# Patient Record
Sex: MALE | Race: WHITE | Employment: FULL TIME | URBAN - METROPOLITAN AREA
[De-identification: names, ages, dates, MRNs, and addresses within clinical notes are randomized per-mention and may not be internally consistent; named-entity substitution may affect disease eponyms.]

---

## 2017-04-18 ENCOUNTER — OFFICE VISIT (OUTPATIENT)
Dept: INTERNAL MEDICINE CLINIC | Age: 57
End: 2017-04-18

## 2017-04-18 VITALS
DIASTOLIC BLOOD PRESSURE: 84 MMHG | WEIGHT: 203 LBS | TEMPERATURE: 98 F | HEIGHT: 68 IN | HEART RATE: 71 BPM | RESPIRATION RATE: 14 BRPM | SYSTOLIC BLOOD PRESSURE: 132 MMHG | OXYGEN SATURATION: 95 % | BODY MASS INDEX: 30.77 KG/M2

## 2017-04-18 DIAGNOSIS — F90.0 ATTENTION DEFICIT HYPERACTIVITY DISORDER (ADHD), PREDOMINANTLY INATTENTIVE TYPE: ICD-10-CM

## 2017-04-18 DIAGNOSIS — I42.2 HYPERTROPHIC CARDIOMYOPATHY (HCC): ICD-10-CM

## 2017-04-18 DIAGNOSIS — N52.9 ERECTILE DYSFUNCTION, UNSPECIFIED ERECTILE DYSFUNCTION TYPE: ICD-10-CM

## 2017-04-18 DIAGNOSIS — E78.00 HYPERCHOLESTEROLEMIA: Chronic | ICD-10-CM

## 2017-04-18 DIAGNOSIS — Z00.00 ROUTINE GENERAL MEDICAL EXAMINATION AT A HEALTH CARE FACILITY: Primary | ICD-10-CM

## 2017-04-18 RX ORDER — SILDENAFIL CITRATE 20 MG/1
20-60 TABLET ORAL
Qty: 60 TAB | Refills: 5 | Status: SHIPPED | OUTPATIENT
Start: 2017-04-18 | End: 2018-07-25 | Stop reason: SDUPTHER

## 2017-04-18 RX ORDER — METHYLPHENIDATE HYDROCHLORIDE 36 MG/1
36 TABLET ORAL DAILY
Qty: 90 TAB | Refills: 0 | Status: SHIPPED | OUTPATIENT
Start: 2017-04-18

## 2017-04-18 RX ORDER — SILDENAFIL 100 MG/1
100 TABLET, FILM COATED ORAL
Qty: 6 TAB | Refills: 11 | Status: CANCELLED | OUTPATIENT
Start: 2017-04-18

## 2017-04-18 NOTE — PROGRESS NOTES
Chief Complaint   Patient presents with   Alexandra Mahoney Establish Care     Goals that were addressed/or need to be completed after this visit:  Health Maintenance Due   Topic    Pneumococcal 19-64 Medium Risk (1 of 1 - PPSV23)    DTaP/Tdap/Td series (1 - Tdap)     · Postponed TDAP & 23 x 1 yr per Dr. Perez Don request.

## 2017-04-18 NOTE — MR AVS SNAPSHOT
Visit Information Date & Time Provider Department Dept. Phone Encounter #  
 4/18/2017  9:30 AM Latonia Khan MD [facility] [phone] [id number] Upcoming Health Maintenance Date Due Pneumococcal 19-64 Medium Risk (1 of 1 - PPSV23) 3/23/1978 DTaP/Tdap/Td series (1 - Tdap) 3/23/1980 COLONOSCOPY 7/31/2018 Allergies as of 4/18/2017  Review Complete On: 4/18/2017 By: Melissa Roth LPN No Known Allergies Current Immunizations  Reviewed on 7/28/2016 Name Date Influenza Vaccine (Quad) PF 9/3/2015 TB Skin Test (PPD) Intradermal 7/28/2016 Not reviewed this visit You Were Diagnosed With   
  
 Codes Comments Routine general medical examination at a health care facility    -  Primary ICD-10-CM: Z00.00 ICD-9-CM: V70.0 Attention deficit hyperactivity disorder (ADHD), predominantly inattentive type     ICD-10-CM: F90.0 ICD-9-CM: 314.00 Hypertrophic cardiomyopathy (HCC)     ICD-10-CM: I42.2 ICD-9-CM: 425.18 Hypercholesterolemia     ICD-10-CM: E78.00 ICD-9-CM: 272.0 Erectile dysfunction, unspecified erectile dysfunction type     ICD-10-CM: N52.9 ICD-9-CM: 607.84 Vitals BP Pulse Temp Resp Height(growth percentile) Weight(growth percentile) 132/84 71 98 °F (36.7 °C) (Oral) 14 5' 7.5\" (1.715 m) 203 lb (92.1 kg) SpO2 BMI Smoking Status 95% 31.33 kg/m2 Never Smoker Vitals History BMI and BSA Data Body Mass Index Body Surface Area  
 31.33 kg/m 2 2.09 m 2 Preferred Pharmacy Pharmacy Name Phone [id number] [address] [place] [address] [phone] Your Updated Medication List  
  
   
This list is accurate as of: 4/18/17 10:23 AM.  Always use your most recent med list.  
  
  
  
  
 clobetasol 0.05 % Sham  
Shampoo hair/scalp, leave on 15 minutes and rinse well. Use daily for 4 weeks methylphenidate 36 mg CR tablet Commonly known as:  CONCERTA Take 1 Tab (36 mg total) by mouth dailyEarliest Fill Date: 4/18/17. Max Daily Amount: 36 mg  
  
 metoprolol succinate 50 mg XL tablet Commonly known as:  TOPROL-XL Take 1 Tab by mouth daily. rosuvastatin 40 mg tablet Commonly known as:  CRESTOR Take 1 Tab by mouth nightly. sildenafil 20 mg tablet Commonly known as:  REVATIO Take 1-3 Tabs by mouth daily as needed. Prescriptions Printed Refills  
 methylphenidate ER 36 mg 24 hr tab 0 Sig: Take 1 Tab (36 mg total) by mouth dailyEarliest Fill Date: 4/18/17. Max Daily Amount: 36 mg  
 Class: Print Route: Oral  
 sildenafil (REVATIO) 20 mg tablet 5 Sig: Take 1-3 Tabs by mouth daily as needed. Class: Print Route: Oral  
  
We Performed the Following CBC WITH AUTOMATED DIFF [15001 CPT(R)] LIPID PANEL [26074 CPT(R)] METABOLIC PANEL, COMPREHENSIVE [02393 CPT(R)] PROSTATE SPECIFIC AG (PSA) [id number] CPT(R)] TSH RFX ON ABNORMAL TO FREE T4 [[id number] Custom] UA/M W/RFLX CULTURE, ROUTINE [[id number] Custom] Introducing [facility]! Dear Eliezer Castaneda: 
Thank you for requesting a [employer] account. Our records indicate that you already have an active [employer] account. You can access your account anytime at [email or web address] Did you know that you can access your hospital and ER discharge instructions at any time in [employer]? You can also review all of your test results from your hospital stay or ER visit. Additional Information If you have questions, please visit the Frequently Asked Questions section of the [employer] website at [email or web address] Remember, [employer] is NOT to be used for urgent needs. For medical emergencies, dial 911. Now available from your iPhone and Android! Please provide this summary of care documentation to your next provider. Your primary care clinician is listed as [place] [phone] If you have any questions after today's visit, please call [phone].

## 2017-04-19 LAB
ALBUMIN SERPL-MCNC: 4.8 G/DL (ref 3.5–5.5)
ALBUMIN/GLOB SERPL: 1.8 {RATIO} (ref 1.2–2.2)
ALP SERPL-CCNC: 62 IU/L (ref 39–117)
ALT SERPL-CCNC: 40 IU/L (ref 0–44)
APPEARANCE UR: CLEAR
AST SERPL-CCNC: 32 IU/L (ref 0–40)
BACTERIA #/AREA URNS HPF: NORMAL /[HPF]
BASOPHILS # BLD AUTO: 0 X10E3/UL (ref 0–0.2)
BASOPHILS NFR BLD AUTO: 0 %
BILIRUB SERPL-MCNC: 0.5 MG/DL (ref 0–1.2)
BILIRUB UR QL STRIP: NEGATIVE
BUN SERPL-MCNC: 17 MG/DL (ref 6–24)
BUN/CREAT SERPL: 22 (ref 9–20)
CALCIUM SERPL-MCNC: 9.3 MG/DL (ref 8.7–10.2)
CASTS URNS QL MICRO: NORMAL /LPF
CHLORIDE SERPL-SCNC: 99 MMOL/L (ref 96–106)
CHOLEST SERPL-MCNC: 187 MG/DL (ref 100–199)
CO2 SERPL-SCNC: 23 MMOL/L (ref 18–29)
COLOR UR: YELLOW
CREAT SERPL-MCNC: 0.77 MG/DL (ref 0.76–1.27)
EOSINOPHIL # BLD AUTO: 0.1 X10E3/UL (ref 0–0.4)
EOSINOPHIL NFR BLD AUTO: 2 %
EPI CELLS #/AREA URNS HPF: NORMAL /HPF
ERYTHROCYTE [DISTWIDTH] IN BLOOD BY AUTOMATED COUNT: 14.9 % (ref 12.3–15.4)
GLOBULIN SER CALC-MCNC: 2.6 G/DL (ref 1.5–4.5)
GLUCOSE SERPL-MCNC: 103 MG/DL (ref 65–99)
GLUCOSE UR QL: NEGATIVE
HCT VFR BLD AUTO: 43.1 % (ref 37.5–51)
HDLC SERPL-MCNC: 38 MG/DL
HGB BLD-MCNC: 14.6 G/DL (ref 12.6–17.7)
HGB UR QL STRIP: NEGATIVE
IMM GRANULOCYTES # BLD: 0 X10E3/UL (ref 0–0.1)
IMM GRANULOCYTES NFR BLD: 0 %
KETONES UR QL STRIP: NEGATIVE
LDLC SERPL CALC-MCNC: 79 MG/DL (ref 0–99)
LEUKOCYTE ESTERASE UR QL STRIP: NEGATIVE
LYMPHOCYTES # BLD AUTO: 1.1 X10E3/UL (ref 0.7–3.1)
LYMPHOCYTES NFR BLD AUTO: 22 %
MCH RBC QN AUTO: 28.8 PG (ref 26.6–33)
MCHC RBC AUTO-ENTMCNC: 33.9 G/DL (ref 31.5–35.7)
MCV RBC AUTO: 85 FL (ref 79–97)
MICRO URNS: NORMAL
MICRO URNS: NORMAL
MONOCYTES # BLD AUTO: 0.5 X10E3/UL (ref 0.1–0.9)
MONOCYTES NFR BLD AUTO: 9 %
MUCOUS THREADS URNS QL MICRO: PRESENT
NEUTROPHILS # BLD AUTO: 3.3 X10E3/UL (ref 1.4–7)
NEUTROPHILS NFR BLD AUTO: 67 %
NITRITE UR QL STRIP: NEGATIVE
PH UR STRIP: 6.5 [PH] (ref 5–7.5)
PLATELET # BLD AUTO: 154 X10E3/UL (ref 150–379)
POTASSIUM SERPL-SCNC: 4.2 MMOL/L (ref 3.5–5.2)
PROT SERPL-MCNC: 7.4 G/DL (ref 6–8.5)
PROT UR QL STRIP: NEGATIVE
PSA SERPL-MCNC: 3.1 NG/ML (ref 0–4)
RBC # BLD AUTO: 5.07 X10E6/UL (ref 4.14–5.8)
RBC #/AREA URNS HPF: NORMAL /HPF
SODIUM SERPL-SCNC: 141 MMOL/L (ref 134–144)
SP GR UR: 1.02 (ref 1–1.03)
TRIGL SERPL-MCNC: 351 MG/DL (ref 0–149)
TSH SERPL DL<=0.005 MIU/L-ACNC: 2.75 UIU/ML (ref 0.45–4.5)
URINALYSIS REFLEX, 377202: NORMAL
UROBILINOGEN UR STRIP-MCNC: 0.2 MG/DL (ref 0.2–1)
VLDLC SERPL CALC-MCNC: 70 MG/DL (ref 5–40)
WBC # BLD AUTO: 5 X10E3/UL (ref 3.4–10.8)
WBC #/AREA URNS HPF: NORMAL /HPF

## 2017-04-19 NOTE — PROGRESS NOTES
HISTORY OF PRESENT ILLNESS  Rubens Marie is a 61 y.o. male. HPI  Presents for a new patient appt to me. Has been seeing PCP at [facility]. Has known history of HCM and has been seeing cardiology for this in [facility]. His meds have not change and his last ECHO and stress test were 2 years ago and stable. Some increase in HOFFMAN as well as some chest fullness with hiking. No diaphoresis or nausea or vomiting. NO PND or orthopnea. No change in bowels or bladder. No fever or chills. No bleeding or melena. His ED is controlled with meds but expensive. His ADD is well controlled with current meds and no side effects. Past Medical History:   Diagnosis Date    Deviated nasal septum     ED (erectile dysfunction)     Elevated PSA     Hematuria     Hypercholesterolemia     Hypertrophic cardiomyopathy (HCC)     Hypertrophy of prostate with urinary obstruction and other lower urinary tract symptoms (LUTS)     Hypogonadism in male     Psoriasis     Vitamin D deficiency      Past Surgical History:   Procedure Laterality Date    HX APPENDECTOMY  1971    [place], [place] HX COLONOSCOPY  2013    HX HEENT Left     Strabismus repair - age 10   [de-identified] SEPTOPLASTY  2010    HX TONSILLECTOMY  1966    [place], [place]     Current Outpatient Prescriptions on File Prior to Visit   Medication Sig Dispense Refill    rosuvastatin (CRESTOR) 40 mg tablet Take 1 Tab by mouth nightly. 90 Tab 3    metoprolol succinate (TOPROL-XL) 50 mg XL tablet Take 1 Tab by mouth daily. 90 Tab 3    clobetasol 0.05 % sham Shampoo hair/scalp, leave on 15 minutes and rinse well. Use daily for 4 weeks 118 mL 2     No current facility-administered medications on file prior to visit.       Social History     Social History    Marital status:      Spouse name: N/A    Number of children: N/A    Years of education: N/A     Occupational History    General Surgeon - [employer]      Social History Main Topics    Smoking status: Never Smoker    Smokeless tobacco: Never Used    Alcohol use 7.2 oz/week     12 Cans of beer per week      Comment: social    Drug use: No    Sexual activity: Yes     Partners: Female     Other Topics Concern    Not on file     Social History Narrative     Family History   Problem Relation Age of Onset   [facility] Arthritis-rheumatoid Mother     Parkinsonism Father     Heart Disease Father        ROS  Per HPI. Some nocturia off and on. Physical Exam  Physical Examination: General appearance - alert, well appearing, and in no distress  Ears - bilateral TM's and external ear canals normal  Nose - normal and patent, no erythema, discharge or polyps  Mouth - mucous membranes moist, pharynx normal without lesions  Neck - supple, no significant adenopathy  Lymphatics - no palpable lymphadenopathy, no hepatosplenomegaly  Chest - clear to auscultation, no wheezes, rales or rhonchi, symmetric air entry  Heart - normal rate and regular rhythm, loud systolic murmur with a rumble in the anterior precordium. Slight diastolic murmur as well. Abdomen - soft, nontender, nondistended, no masses or organomegaly  Neurological - alert, oriented, normal speech, no focal findings or movement disorder noted  Musculoskeletal - no joint tenderness, deformity or swelling  Extremities - peripheral pulses normal, no pedal edema, no clubbing or cyanosis    ASSESSMENT and Kiana Krueger was seen today for establish care. Diagnoses and all orders for this visit:    Routine general medical examination at a health care facility  -     CBC WITH AUTOMATED DIFF  -     METABOLIC PANEL, COMPREHENSIVE  -     PROSTATE SPECIFIC AG  -     LIPID PANEL  -     TSH RFX ON ABNORMAL TO FREE T4  -     UA/M W/RFLX CULTURE, ROUTINE    Attention deficit hyperactivity disorder (ADHD), predominantly inattentive type  - controlled well and will continue same meds for now with no side effects and no cardiology contraindications for this. -     methylphenidate ER 36 mg 24 hr tab;  Take 1 Tab (36 mg total) by mouth dailyEarliest Fill Date: 4/18/17. Max Daily Amount: 36 mg    Hypertrophic cardiomyopathy ([facility] [address] - will arrange cardiology to see for new stress test and possible referral for surgical correction    Hypercholesterolemia - check labs and be sure LDL is at goal of 100. Erectile dysfunction, unspecified erectile dysfunction type - will try generic sildenafil for cost reasons. Other orders  -     sildenafil (REVATIO) 20 mg tablet; Take 1-3 Tabs by mouth daily as needed. BPH - has been on flomax in the past with little change. Will refer to urology for evaluation after the above cardiac evaluation. Follow-up Disposition: 6 months and as needed. Advised him to call back or return to office if symptoms worsen/change/persist.  Discussed expected course/resolution/complications of diagnosis in detail with patient. Medication risks/benefits/costs/interactions/alternatives discussed with patient. He was given an after visit summary which includes diagnoses, current medications, & vitals. He expressed understanding with the diagnosis and plan.

## 2017-06-21 ENCOUNTER — OFFICE VISIT (OUTPATIENT)
Dept: CARDIOLOGY CLINIC | Age: 57
End: 2017-06-21

## 2017-06-21 VITALS
WEIGHT: 207.8 LBS | HEART RATE: 76 BPM | BODY MASS INDEX: 31.49 KG/M2 | SYSTOLIC BLOOD PRESSURE: 136 MMHG | HEIGHT: 68 IN | DIASTOLIC BLOOD PRESSURE: 78 MMHG | OXYGEN SATURATION: 96 % | RESPIRATION RATE: 16 BRPM

## 2017-06-21 DIAGNOSIS — I42.1 HYPERTROPHIC OBSTRUCTIVE CARDIOMYOPATHY (HCC): Primary | ICD-10-CM

## 2017-06-21 DIAGNOSIS — I20.9 ANGINA, CLASS II (HCC): ICD-10-CM

## 2017-06-21 DIAGNOSIS — E78.00 HYPERCHOLESTEROLEMIA: Chronic | ICD-10-CM

## 2017-06-21 DIAGNOSIS — R06.09 DOE (DYSPNEA ON EXERTION): ICD-10-CM

## 2017-06-21 DIAGNOSIS — G47.33 OSA (OBSTRUCTIVE SLEEP APNEA): ICD-10-CM

## 2017-06-21 RX ORDER — ASPIRIN 81 MG/1
TABLET ORAL DAILY
COMMUNITY

## 2017-06-21 NOTE — PROGRESS NOTES
Arnel Lyons MD [facility]  [address]  [place], [address] [facility]    Office [phone]  Fax [phone]  Cell [phone]        Cassie Barajas is a 61 y.o. male referred for evaluation of HCM, chest pain and HOFFMAN. Consult requested by Carissa Vzaquez MD.    Assessment:  Encounter Diagnoses   Name Primary?  Hypercholesterolemia     HOFFMAN (dyspnea on exertion)     Hypertrophic obstructive cardiomyopathy (HCC) Yes    Angina, class II (HCC)     KEZIA (obstructive sleep apnea)        Recommendations:    Cassie Barajas has HCM with LVOT obstruction, now clearly symptomatic over the past several months with dyspnea and anginal type chest pain. Extensive workup by Dr. Rosalie Pepe over the past few years including holter, stress echo and cardiac MRI have not demonstrated any high risk findings other than septal wall thickness of 23 mm. He has class 2-3 symptoms at this time on low dose betablocker therapy. I am leery about escalating the dose due to OK prolongation. The question is whether his sx reflect the natural history of HCM and/or whether he has concomitant obstructive CAD. Will repeat echo in the near future. I favor cath to rule out high risk CAD as well as referral to [facility] for discussion of surgical myectomy vs. Alcohol septal ablation. Will defer cath until he's had his hemorrhoids evaluated (intermittent bleeding)    He may have KEZIA - suggest sleep study at some point    Phone follow up after reviewing tests. Subjective:    Dr. Payal Madden has hypertrophic cardiomyopathy and dyslipidemia previously saw Dr. Rosalie Pepe in [address] [place] [address]. Previous records in EMR reviewed including echo, stress echo, holter and cardiac MRI    Reports HOFFMAN with moderate activity particularly with walking and inclines. Additionally reports chest discomfort with moderate activity. He tracks his HR via FitBit during these spells and does not report any irregularities.   Denies any associated palpitations, dizziness or syncope. Wife reports significant snoring with occasional breathing difficulties. He reports daytime fatigue but does not fall asleep inappropriately. Patient denies any palpitations, syncope, orthopnea, edema or paroxysmal nocturnal dyspnea. He is also suffering from bleeding hemorrhoids. He does not pursue regular exercise. No claudication symptoms. He drinks a lot of coffee through the day. Cardiac risk factors   HTN no  DM no  Dyslipidemia yes  Smoking no  Family hx of CAD yes- Multiple uncles that passed away with MI.     Cardiac testing  Stress echo 07/15/15- 10:38 minutes HCM but no ischemia. Echo 06/10/15- LVEF 65%, HCM. Resting LVOT velocity 6.3m/s, peak grad 155 mmHg, no significant change post V    Past Medical History:   Diagnosis Date    Deviated nasal septum     ED (erectile dysfunction)     Elevated PSA     Hematuria     Hypercholesterolemia     Hypertrophic cardiomyopathy (HCC)     Hypertrophy of prostate with urinary obstruction and other lower urinary tract symptoms (LUTS)     Hypogonadism in male     Psoriasis     Vitamin D deficiency         Current Outpatient Prescriptions   Medication Sig Dispense Refill    aspirin delayed-release 81 mg tablet Take  by mouth daily.  methylphenidate ER 36 mg 24 hr tab Take 1 Tab (36 mg total) by mouth dailyEarliest Fill Date: 4/18/17. Max Daily Amount: 36 mg 90 Tab 0    sildenafil (REVATIO) 20 mg tablet Take 1-3 Tabs by mouth daily as needed. 60 Tab 5    rosuvastatin (CRESTOR) 40 mg tablet Take 1 Tab by mouth nightly. 90 Tab 3    metoprolol succinate (TOPROL-XL) 50 mg XL tablet Take 1 Tab by mouth daily. 90 Tab 3    clobetasol 0.05 % sham Shampoo hair/scalp, leave on 15 minutes and rinse well. 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      No Known Allergies       Review of Systems  Constitutional: Negative for fever, chills, malaise/fatigue and diaphoresis. Respiratory: Negative for cough, hemoptysis, sputum production and wheezing. Positive for HOFFMAN. Cardiovascular: Negative for palpitations, orthopnea, claudication, leg swelling and PND. Positive for chest pain. Gastrointestinal: Negative for heartburn, nausea, vomiting, blood in stool and melena. Genitourinary: Negative for dysuria and flank pain. Musculoskeletal: Negative for joint pain and back pain. Skin: Negative for rash. Neurological: Negative for focal weakness, seizures, loss of consciousness, weakness and headaches. Endo/Heme/Allergies: Does not bruise/bleed easily. Psychiatric/Behavioral: Negative for memory loss. The patient does not have insomnia. Physical Exam:    Visit Vitals    /78 (BP 1 Location: Left arm, BP Patient Position: Sitting)    Pulse 76    Resp 16    Ht 5' 7.5\" (1.715 m)    Wt 207 lb 12.8 oz (94.3 kg)    SpO2 96%    BMI 32.07 kg/m2     Wt Readings from Last 3 Encounters:   06/21/17 207 lb 12.8 oz (94.3 kg)   04/18/17 203 lb (92.1 kg)   10/10/16 204 lb (92.5 kg)      General - well developed well nourished  Neck - JVP normal, thyroid nl. Carotids with bisferiens pulse  Cardiac - normal S1, S2, grade 2-3 systolic murmur at RUSB, increased post Valsalva, no rubs or gallops. No clicks  Vascular - carotids without bruits, radials, femorals and pedal pulses equal bilateral  Lungs - clear to auscultation bilaterals, no rales, wheezing or rhonchi  Abd - soft nontender, no HSM, no abd bruits  Extremities - no edema  Skin - no rash  Neuro - nonfocal  Psych - normal mood and affect      Cardiographics    EKG 06/21/17-SR 79, 1st degree AV block,  LVH with secondary repolarization changes, no significant change from 06/02/15. Stress echo 07/15/15- 10:38 minutes HCM but no ischemia. Echo 06/10/15- LVEF 65%, HCM. Resting LVOT velocity 6.3m/s.      Written by Steve Munoz, as dictated by Carisa Escalante MD.   Carisa Escalante MD

## 2017-06-21 NOTE — MR AVS SNAPSHOT
Visit Information Date & Time Provider Department Dept. Phone Encounter #  
 6/21/2017  9:00 AM Niru Inman MD [facility] [phone] [id number] Your Appointments 6/27/2017  4:00 PM  
ECHO CARDIOGRAMS 2D with [facility] (JENNY SCHEDULING) Appt Note: echo per dr Yovani Carlson [address]  
[address] [facility] [address] Upcoming Health Maintenance Date Due Pneumococcal 19-64 Medium Risk (1 of 1 - PPSV23) 4/18/2018* DTaP/Tdap/Td series (1 - Tdap) 4/18/2018* INFLUENZA AGE 9 TO ADULT 8/1/2017 COLONOSCOPY 7/31/2018 *Topic was postponed. The date shown is not the original due date. Allergies as of 6/21/2017  Review Complete On: 6/21/2017 By: Fidelia Hines LPN No Known Allergies Current Immunizations  Reviewed on 7/28/2016 Name Date Influenza Vaccine (Quad) PF 9/3/2015 TB Skin Test (PPD) Intradermal 7/28/2016 Not reviewed this visit You Were Diagnosed With   
  
 Codes Comments Hypercholesterolemia    -  Primary ICD-10-CM: E78.00 ICD-9-CM: 272.0 Chest pain on exertion     ICD-10-CM: R07.9 ICD-9-CM: 786.50 HOFFMAN (dyspnea on exertion)     ICD-10-CM: R06.09 
ICD-9-CM: 786.09 Chronic fatigue     ICD-10-CM: R53.82 
ICD-9-CM: 780.79 Vitals BP Pulse Resp Height(growth percentile) Weight(growth percentile) SpO2  
 136/78 (BP 1 Location: Left arm, BP Patient Position: Sitting) 76 16 5' 7.5\" (1.715 m) 207 lb 12.8 oz (94.3 kg) 96% BMI Smoking Status 32.07 kg/m2 Never Smoker BMI and BSA Data Body Mass Index Body Surface Area 32.07 kg/m 2 2.12 m 2 Preferred Pharmacy Pharmacy Name Phone [id number] [address] [phone] Your Updated Medication List  
  
   
 This list is accurate as of: 6/21/17  9:51 AM.  Always use your most recent med list.  
  
  
  
  
 aspirin delayed-release 81 mg tablet Take  by mouth daily. clobetasol 0.05 % Sham  
Shampoo hair/scalp, leave on 15 minutes and rinse well. Use daily for 4 weeks  
  
 methylphenidate 36 mg CR tablet Commonly known as:  CONCERTA Take 1 Tab (36 mg total) by mouth dailyEarliest Fill Date: 4/18/17. Max Daily Amount: 36 mg  
  
 metoprolol succinate 50 mg XL tablet Commonly known as:  TOPROL-XL Take 1 Tab by mouth daily. rosuvastatin 40 mg tablet Commonly known as:  CRESTOR Take 1 Tab by mouth nightly. sildenafil 20 mg tablet Commonly known as:  REVATIO Take 1-3 Tabs by mouth daily as needed. We Performed the Following AMB POC EKG ROUTINE W/ 12 LEADS, INTER & REP [01539 CPT(R)] Introducing [facility]! Dear Evelyn Pa: 
Thank you for requesting a [employer] account. Our records indicate that you already have an active [employer] account. You can access your account anytime at [email or web address] Did you know that you can access your hospital and ER discharge instructions at any time in [employer]? You can also review all of your test results from your hospital stay or ER visit. Additional Information If you have questions, please visit the Frequently Asked Questions section of the [employer] website at [email or web address] Remember, [employer] is NOT to be used for urgent needs. For medical emergencies, dial 911. Now available from your iPhone and Android! Please provide this summary of care documentation to your next provider. Your primary care clinician is listed as Raheem [phone] If you have any questions after today's visit, please call [phone].

## 2017-06-27 ENCOUNTER — CLINICAL SUPPORT (OUTPATIENT)
Dept: CARDIOLOGY CLINIC | Age: 57
End: 2017-06-27

## 2017-06-27 DIAGNOSIS — I42.1 HYPERTROPHIC OBSTRUCTIVE CARDIOMYOPATHY (HCC): Primary | ICD-10-CM

## 2017-06-27 DIAGNOSIS — I20.9 ANGINA, CLASS II (HCC): ICD-10-CM

## 2017-06-27 DIAGNOSIS — E78.00 HYPERCHOLESTEROLEMIA: Chronic | ICD-10-CM

## 2017-06-29 NOTE — PROGRESS NOTES
Stable HCM findings.  Will refer to Dr Diandra Ford from [facility] to discuss Rx options -

## 2017-11-07 DIAGNOSIS — E78.00 HYPERCHOLESTEROLEMIA: Chronic | ICD-10-CM

## 2017-11-07 DIAGNOSIS — I42.2 HYPERTROPHIC CARDIOMYOPATHY (HCC): ICD-10-CM

## 2017-11-07 RX ORDER — METOPROLOL SUCCINATE 50 MG/1
50 TABLET, EXTENDED RELEASE ORAL DAILY
Qty: 90 TAB | Refills: 3 | Status: SHIPPED | OUTPATIENT
Start: 2017-11-07 | End: 2018-10-13 | Stop reason: SDUPTHER

## 2017-11-07 RX ORDER — ROSUVASTATIN CALCIUM 40 MG/1
40 TABLET, COATED ORAL
Qty: 90 TAB | Refills: 3 | Status: SHIPPED | OUTPATIENT
Start: 2017-11-07 | End: 2018-10-13 | Stop reason: SDUPTHER

## 2018-04-02 ENCOUNTER — TELEPHONE (OUTPATIENT)
Dept: CARDIOLOGY CLINIC | Age: 58
End: 2018-04-02

## 2018-04-02 ENCOUNTER — CLINICAL SUPPORT (OUTPATIENT)
Dept: INTERNAL MEDICINE CLINIC | Age: 58
End: 2018-04-02

## 2018-04-02 VITALS
HEART RATE: 78 BPM | SYSTOLIC BLOOD PRESSURE: 120 MMHG | RESPIRATION RATE: 17 BRPM | WEIGHT: 212.4 LBS | DIASTOLIC BLOOD PRESSURE: 80 MMHG | OXYGEN SATURATION: 98 % | HEIGHT: 68 IN | TEMPERATURE: 98 F | BODY MASS INDEX: 32.19 KG/M2

## 2018-04-02 DIAGNOSIS — I42.1 HYPERTROPHIC OBSTRUCTIVE CARDIOMYOPATHY (HCC): Primary | ICD-10-CM

## 2018-04-02 NOTE — PROGRESS NOTES
Patient requested an EKG for followup of hypertrophic cardiomyopathy and initiation of Nadolol at [facility]. Needs EKG to send to them for followup. EKG and copy given to him to provide to [facility], as well as access in [facility]. [facility] cards will read and followup.

## 2018-04-02 NOTE — TELEPHONE ENCOUNTER
Juliana Cochran with [facility] called to obtain images from the echo done in June 2017 and July 2015 and June 2015   Phone: [phone]  FAX: [phone]

## 2018-07-25 RX ORDER — SILDENAFIL CITRATE 20 MG/1
20-60 TABLET ORAL
Qty: 90 TAB | Refills: 5 | Status: SHIPPED | OUTPATIENT
Start: 2018-07-25

## 2018-10-13 DIAGNOSIS — E78.00 HYPERCHOLESTEROLEMIA: Chronic | ICD-10-CM

## 2018-10-13 DIAGNOSIS — I42.2 HYPERTROPHIC CARDIOMYOPATHY (HCC): ICD-10-CM

## 2018-10-14 RX ORDER — METOPROLOL SUCCINATE 50 MG/1
TABLET, EXTENDED RELEASE ORAL
Qty: 90 TAB | Refills: 3 | Status: SHIPPED | OUTPATIENT
Start: 2018-10-14 | End: 2019-01-29 | Stop reason: SDUPTHER

## 2018-10-14 RX ORDER — ROSUVASTATIN CALCIUM 40 MG/1
TABLET, COATED ORAL
Qty: 90 TAB | Refills: 3 | Status: SHIPPED | OUTPATIENT
Start: 2018-10-14 | End: 2019-01-29 | Stop reason: SDUPTHER

## 2019-01-11 ENCOUNTER — TELEPHONE (OUTPATIENT)
Dept: INTERNAL MEDICINE CLINIC | Age: 59
End: 2019-01-11

## 2019-01-11 NOTE — TELEPHONE ENCOUNTER
Please fax labs notes and ekg and echo results to Dr Steve Ayala fax [phone]   [address] [place] [address]

## 2019-01-29 DIAGNOSIS — E78.00 HYPERCHOLESTEROLEMIA: Chronic | ICD-10-CM

## 2019-01-29 DIAGNOSIS — I42.2 HYPERTROPHIC CARDIOMYOPATHY (HCC): ICD-10-CM

## 2019-01-29 RX ORDER — METOPROLOL SUCCINATE 50 MG/1
TABLET, EXTENDED RELEASE ORAL
Qty: 90 TAB | Refills: 2 | Status: SHIPPED | OUTPATIENT
Start: 2019-01-29 | End: 2019-10-31 | Stop reason: SDUPTHER

## 2019-01-29 RX ORDER — ROSUVASTATIN CALCIUM 40 MG/1
TABLET, COATED ORAL
Qty: 90 TAB | Refills: 2 | Status: SHIPPED | OUTPATIENT
Start: 2019-01-29 | End: 2019-10-31 | Stop reason: SDUPTHER

## 2019-10-31 DIAGNOSIS — E78.00 HYPERCHOLESTEROLEMIA: Chronic | ICD-10-CM

## 2019-10-31 DIAGNOSIS — I42.2 HYPERTROPHIC CARDIOMYOPATHY (HCC): ICD-10-CM

## 2019-10-31 RX ORDER — ROSUVASTATIN CALCIUM 40 MG/1
TABLET, COATED ORAL
Qty: 30 TAB | Refills: 0 | Status: SHIPPED | OUTPATIENT
Start: 2019-10-31

## 2019-10-31 RX ORDER — METOPROLOL SUCCINATE 50 MG/1
TABLET, EXTENDED RELEASE ORAL
Qty: 30 TAB | Refills: 0 | Status: SHIPPED | OUTPATIENT
Start: 2019-10-31